# Patient Record
Sex: MALE | NOT HISPANIC OR LATINO
[De-identification: names, ages, dates, MRNs, and addresses within clinical notes are randomized per-mention and may not be internally consistent; named-entity substitution may affect disease eponyms.]

---

## 2017-01-04 ENCOUNTER — PRIOR ORIGINAL RECORDS (OUTPATIENT)
Dept: OTHER | Age: 62
End: 2017-01-04

## 2017-07-05 ENCOUNTER — PRIOR ORIGINAL RECORDS (OUTPATIENT)
Dept: OTHER | Age: 62
End: 2017-07-05

## 2017-07-24 PROBLEM — M71.22 BAKER'S CYST, LEFT: Status: ACTIVE | Noted: 2017-07-24

## 2017-09-11 PROBLEM — S83.242D OTHER TEAR OF MEDIAL MENISCUS OF LEFT KNEE AS CURRENT INJURY, SUBSEQUENT ENCOUNTER: Status: ACTIVE | Noted: 2017-09-11

## 2018-01-03 ENCOUNTER — PRIOR ORIGINAL RECORDS (OUTPATIENT)
Dept: OTHER | Age: 63
End: 2018-01-03

## 2018-07-05 ENCOUNTER — ANESTHESIA EVENT (OUTPATIENT)
Dept: SURGERY | Facility: HOSPITAL | Age: 63
End: 2018-07-05
Payer: COMMERCIAL

## 2018-07-06 ENCOUNTER — HOSPITAL ENCOUNTER (OUTPATIENT)
Facility: HOSPITAL | Age: 63
Setting detail: HOSPITAL OUTPATIENT SURGERY
Discharge: HOME | End: 2018-07-06
Attending: ORTHOPAEDIC SURGERY | Admitting: ORTHOPAEDIC SURGERY
Payer: COMMERCIAL

## 2018-07-06 ENCOUNTER — ANESTHESIA (OUTPATIENT)
Dept: SURGERY | Facility: HOSPITAL | Age: 63
End: 2018-07-06
Payer: COMMERCIAL

## 2018-07-06 ENCOUNTER — APPOINTMENT (OUTPATIENT)
Dept: GENERAL RADIOLOGY | Facility: HOSPITAL | Age: 63
End: 2018-07-06
Attending: ORTHOPAEDIC SURGERY
Payer: COMMERCIAL

## 2018-07-06 ENCOUNTER — SURGERY (OUTPATIENT)
Age: 63
End: 2018-07-06

## 2018-07-06 VITALS
TEMPERATURE: 97 F | SYSTOLIC BLOOD PRESSURE: 135 MMHG | HEIGHT: 66 IN | BODY MASS INDEX: 28.31 KG/M2 | WEIGHT: 176.13 LBS | DIASTOLIC BLOOD PRESSURE: 93 MMHG | HEART RATE: 70 BPM | RESPIRATION RATE: 16 BRPM | OXYGEN SATURATION: 96 %

## 2018-07-06 DIAGNOSIS — S83.282D TEAR OF LATERAL MENISCUS OF LEFT KNEE, UNSPECIFIED TEAR TYPE, UNSPECIFIED WHETHER OLD OR CURRENT TEAR, SUBSEQUENT ENCOUNTER: ICD-10-CM

## 2018-07-06 DIAGNOSIS — M71.22 BAKER'S CYST OF KNEE, LEFT: ICD-10-CM

## 2018-07-06 DIAGNOSIS — S83.242D TEAR OF MEDIAL MENISCUS OF LEFT KNEE, UNSPECIFIED TEAR TYPE, UNSPECIFIED WHETHER OLD OR CURRENT TEAR, SUBSEQUENT ENCOUNTER: ICD-10-CM

## 2018-07-06 PROCEDURE — 0SBD4ZZ EXCISION OF LEFT KNEE JOINT, PERCUTANEOUS ENDOSCOPIC APPROACH: ICD-10-PCS | Performed by: ORTHOPAEDIC SURGERY

## 2018-07-06 RX ORDER — HYDROCODONE BITARTRATE AND ACETAMINOPHEN 10; 325 MG/1; MG/1
1-2 TABLET ORAL EVERY 4 HOURS PRN
Qty: 50 TABLET | Refills: 0 | Status: SHIPPED | OUTPATIENT
Start: 2018-07-06 | End: 2019-06-04

## 2018-07-06 RX ORDER — ACETAMINOPHEN 500 MG
1000 TABLET ORAL EVERY 6 HOURS PRN
COMMUNITY
End: 2019-07-10

## 2018-07-06 RX ORDER — SODIUM CHLORIDE, SODIUM LACTATE, POTASSIUM CHLORIDE, CALCIUM CHLORIDE 600; 310; 30; 20 MG/100ML; MG/100ML; MG/100ML; MG/100ML
INJECTION, SOLUTION INTRAVENOUS CONTINUOUS
Status: DISCONTINUED | OUTPATIENT
Start: 2018-07-06 | End: 2018-07-06

## 2018-07-06 RX ORDER — METOCLOPRAMIDE HYDROCHLORIDE 5 MG/ML
10 INJECTION INTRAMUSCULAR; INTRAVENOUS AS NEEDED
Status: DISCONTINUED | OUTPATIENT
Start: 2018-07-06 | End: 2018-07-06

## 2018-07-06 RX ORDER — LABETALOL HYDROCHLORIDE 5 MG/ML
5 INJECTION, SOLUTION INTRAVENOUS EVERY 5 MIN PRN
Status: DISCONTINUED | OUTPATIENT
Start: 2018-07-06 | End: 2018-07-06

## 2018-07-06 RX ORDER — HYDROCODONE BITARTRATE AND ACETAMINOPHEN 5; 325 MG/1; MG/1
1 TABLET ORAL AS NEEDED
Status: COMPLETED | OUTPATIENT
Start: 2018-07-06 | End: 2018-07-06

## 2018-07-06 RX ORDER — NALOXONE HYDROCHLORIDE 0.4 MG/ML
80 INJECTION, SOLUTION INTRAMUSCULAR; INTRAVENOUS; SUBCUTANEOUS AS NEEDED
Status: DISCONTINUED | OUTPATIENT
Start: 2018-07-06 | End: 2018-07-06

## 2018-07-06 RX ORDER — MIDAZOLAM HYDROCHLORIDE 1 MG/ML
1 INJECTION INTRAMUSCULAR; INTRAVENOUS EVERY 5 MIN PRN
Status: DISCONTINUED | OUTPATIENT
Start: 2018-07-06 | End: 2018-07-06

## 2018-07-06 RX ORDER — MEPERIDINE HYDROCHLORIDE 25 MG/ML
INJECTION INTRAMUSCULAR; INTRAVENOUS; SUBCUTANEOUS
Status: COMPLETED
Start: 2018-07-06 | End: 2018-07-06

## 2018-07-06 RX ORDER — MORPHINE SULFATE 4 MG/ML
2 INJECTION, SOLUTION INTRAMUSCULAR; INTRAVENOUS EVERY 5 MIN PRN
Status: DISCONTINUED | OUTPATIENT
Start: 2018-07-06 | End: 2018-07-06

## 2018-07-06 RX ORDER — HYDROCODONE BITARTRATE AND ACETAMINOPHEN 5; 325 MG/1; MG/1
2 TABLET ORAL AS NEEDED
Status: COMPLETED | OUTPATIENT
Start: 2018-07-06 | End: 2018-07-06

## 2018-07-06 RX ORDER — BUPIVACAINE HYDROCHLORIDE AND EPINEPHRINE 5; 5 MG/ML; UG/ML
INJECTION, SOLUTION EPIDURAL; INTRACAUDAL; PERINEURAL AS NEEDED
Status: DISCONTINUED | OUTPATIENT
Start: 2018-07-06 | End: 2018-07-06

## 2018-07-06 RX ORDER — ACETAMINOPHEN 500 MG
1000 TABLET ORAL ONCE
Status: DISCONTINUED | OUTPATIENT
Start: 2018-07-06 | End: 2018-07-06 | Stop reason: HOSPADM

## 2018-07-06 RX ORDER — ONDANSETRON 2 MG/ML
4 INJECTION INTRAMUSCULAR; INTRAVENOUS AS NEEDED
Status: DISCONTINUED | OUTPATIENT
Start: 2018-07-06 | End: 2018-07-06

## 2018-07-06 RX ORDER — MEPERIDINE HYDROCHLORIDE 25 MG/ML
12.5 INJECTION INTRAMUSCULAR; INTRAVENOUS; SUBCUTANEOUS AS NEEDED
Status: DISCONTINUED | OUTPATIENT
Start: 2018-07-06 | End: 2018-07-06

## 2018-07-06 NOTE — ANESTHESIA PREPROCEDURE EVALUATION
PRE-OP EVALUATION    Patient Name: Marcos Calles    Pre-op Diagnosis: Baker's cyst of knee, left [M71.22]  Tear of medial meniscus of left knee, unspecified tear type, unspecified whether old or current tear, subsequent encounter [H33.232I]  Tear of lat degenerative     Left hip pain     Osteoarthrosis, unspecified whether generalized or localized, shoulder region         GLOBAL EXP 8-25-15 / Left Shoulder / Sully Blankschner / Eleonora Piña     Ibrahim's cyst, left     Other tear of medial meniscus of left knee as current

## 2018-07-06 NOTE — ANESTHESIA POSTPROCEDURE EVALUATION
[facility] Buckley Guarneri Patient Status:  Hospital Outpatient Surgery   Age/Gender 65year old male MRN [id number]   [facility] SURGERY Attending Etienne Stubbs MD   Hosp Day # 0 PCP Tanya Hughes MD       Anesthesia Post-op N

## 2018-07-07 NOTE — OPERATIVE REPORT
[facility]    PATIENT'S NAME: Marian Hylton   ATTENDING PHYSICIAN: Melissa Bianchi M.D. OPERATING PHYSICIAN: Melissa Bianchi M.D.    PATIENT ACCOUNT#:   [de-identified]    LOCATION:  PREOPASCC  PRE ASCC 1 EDWP 10  MEDICAL RECORD #:   [id number]       DA trocar was passed and a cannula for the arthroscope. The suprapatellar pouch was inspected first.  It appeared to be normal.  The articular surface of the patella was inspected. Only trace degenerative change was seen.   Tracking of the patella was normal divided. Hemostasis was obtained. Blunt dissection was performed through the subcutaneous fat. The fascia was opened. My gloved digit was used to explore the popliteal space.   I removed the leg powell and externally rotated the leg in order to gain acc

## 2018-07-08 ENCOUNTER — APPOINTMENT (OUTPATIENT)
Dept: ULTRASOUND IMAGING | Facility: HOSPITAL | Age: 63
End: 2018-07-08
Attending: EMERGENCY MEDICINE
Payer: COMMERCIAL

## 2018-07-08 ENCOUNTER — HOSPITAL ENCOUNTER (EMERGENCY)
Facility: HOSPITAL | Age: 63
Discharge: HOME | End: 2018-07-08
Attending: EMERGENCY MEDICINE
Payer: COMMERCIAL

## 2018-07-08 VITALS
DIASTOLIC BLOOD PRESSURE: 71 MMHG | WEIGHT: 173 LBS | HEIGHT: 66 IN | SYSTOLIC BLOOD PRESSURE: 105 MMHG | BODY MASS INDEX: 27.8 KG/M2 | RESPIRATION RATE: 18 BRPM | HEART RATE: 96 BPM | OXYGEN SATURATION: 99 % | TEMPERATURE: 98 F

## 2018-07-08 DIAGNOSIS — G89.18 POSTOPERATIVE PAIN: Primary | ICD-10-CM

## 2018-07-08 PROCEDURE — 99284 EMERGENCY DEPT VISIT MOD MDM: CPT

## 2018-07-08 PROCEDURE — 99283 EMERGENCY DEPT VISIT LOW MDM: CPT

## 2018-07-08 PROCEDURE — 93971 EXTREMITY STUDY: CPT | Performed by: EMERGENCY MEDICINE

## 2018-07-08 RX ORDER — HYDROCODONE BITARTRATE AND ACETAMINOPHEN 5; 325 MG/1; MG/1
2 TABLET ORAL ONCE
Status: COMPLETED | OUTPATIENT
Start: 2018-07-08 | End: 2018-07-08

## 2018-07-08 NOTE — ED PROVIDER NOTES
Patient Seen in: [facility] Emergency Department    History   Patient presents with:  Postop/Procedure Problem    Stated Complaint: post op pain x2 days knee arthroscopy and bakers cyst removal    HPI    65year-old-year-old gentleman comes in for e incision clean dry intact. Slight ecchymosis noted some slight swelling distally. Homans sign negative. Nursing note and vitals reviewed.            ED Course   Labs Reviewed - No data to display    ED Course as of Jul 08 0537  -------------------------

## 2018-07-08 NOTE — ED NOTES
The wife informs staff that the pt, who was instructed by his surgeon to use a walker, hasn't been. When asked by writer how he's been getting around, the pt replies through a chuckle, \"I'm stubborn. \"

## 2018-07-08 NOTE — ED INITIAL ASSESSMENT (HPI)
Pt had surgery Friday morning for repair of meniscus and bakers cysts. Patient sts having severe pain started around midnight.  Did not take any pain medication prior to arrival.

## 2018-07-09 PROBLEM — Z47.89 ORTHOTIC TRAINING: Status: ACTIVE | Noted: 2018-07-09

## 2018-07-25 ENCOUNTER — PRIOR ORIGINAL RECORDS (OUTPATIENT)
Dept: OTHER | Age: 63
End: 2018-07-25

## 2018-08-23 PROBLEM — M94.262 CHONDROMALACIA OF LEFT KNEE: Status: ACTIVE | Noted: 2018-08-23

## 2018-08-23 PROBLEM — Z98.890 S/P LEFT KNEE ARTHROSCOPY: Status: ACTIVE | Noted: 2018-08-23

## 2019-07-10 ENCOUNTER — APPOINTMENT (OUTPATIENT)
Dept: LAB | Age: 64
End: 2019-07-10
Attending: UROLOGY
Payer: COMMERCIAL

## 2019-07-10 DIAGNOSIS — R97.20 PSA ELEVATION: ICD-10-CM

## 2019-07-10 PROCEDURE — 87086 URINE CULTURE/COLONY COUNT: CPT

## 2019-07-25 PROCEDURE — 88305 TISSUE EXAM BY PATHOLOGIST: CPT | Performed by: UROLOGY

## 2019-07-25 PROCEDURE — 88344 IMHCHEM/IMCYTCHM EA MLT ANTB: CPT | Performed by: UROLOGY

## 2019-12-31 ENCOUNTER — PRIOR ORIGINAL RECORDS (OUTPATIENT)
Dept: OTHER | Age: 64
End: 2019-12-31

## 2020-10-09 LAB
ALBUMIN/GLOB SERPL: 1.4 (CALC) (ref 1–2.5)
ALBUMIN: 4.2 G/DL (ref 3.6–5.1)
ALKALINE PHOSPHATASE: 63 UNIT/L (ref 40–115)
ALT: 18 UNIT/L (ref 9–46)
AST: 19 UNIT/L (ref 10–35)
BASO%: 0.5 %
BASO%: 0.9 %
BASO%: 1 %
BASO%: 1 %
BASO: 0 10^3/UL
BILIRUBIN, TOTAL: 0.5 MG/DL (ref 0.2–1.2)
BUN/CREATININE RATIO: NORMAL (CALC) (ref 6–22)
CALCIUM: 9.4 MG/DL (ref 8.6–10.3)
CARBON DIOXIDE: 26 MMOL/L (ref 20–31)
CHLORIDE: 103 MMOL/L (ref 98–110)
CHOL/HDLC RATIO: 4.4 (CALC)
CHOLESTEROL, TOTAL: 241 MG/DL
CRCL (C&G) (MOSAIQ HL): 110.53 ML/MIN
CREATININE CLEARANCE (MOSAIQ HL): 84.7 ML/MIN
CREATININE: 0.77 MG/DL (ref 0.7–1.25)
EGFR AFRICAN AMERICAN: 114 ML/MIN/1.73M2
EGFR NON-AFR. AMERICAN: 98 ML/MIN/1.73M2
EOS%: 1.2 %
EOS%: 1.4 %
EOS%: 2.3 %
EOS%: 2.4 %
EOS: 0 10^3/UL
EOS: 0.1 10^3/UL
GLOBULIN: 3.1 G/DL (CALC) (ref 1.9–3.7)
GLUCOSE: 94 MG/DL (ref 65–99)
HCT: 42.3 % (ref 38–54)
HCT: 42.4 % (ref 38–54)
HCT: 42.8 % (ref 38–54)
HCT: 43.2 % (ref 38–54)
HDL CHOLESTEROL: 55 MG/DL
HEMOGLOBIN A1C: 5.4 % OF TOTAL HGB
HGB: 14.3 G/DL (ref 12–18)
HGB: 14.4 G/DL (ref 12–18)
HGB: 14.6 G/DL (ref 12–18)
HGB: 14.6 G/DL (ref 12–18)
LDL-CHOLESTEROL: 153 MG/DL (CALC)
LYMPH%: 31.8 % (ref 12–44)
LYMPH%: 35 % (ref 12–44)
LYMPH%: 39.6 % (ref 12–44)
LYMPH%: 43.8 % (ref 12–44)
LYMPH: 1 10^3/UL (ref 0.8–2.8)
LYMPH: 1.4 10^3/UL (ref 0.8–2.8)
LYMPH: 1.5 10^3/UL (ref 0.8–2.8)
LYMPH: 1.8 10^3/UL (ref 0.8–2.8)
MCH: 32.3 PG (ref 26–33)
MCH: 32.4 PG (ref 26–33)
MCH: 32.5 PG (ref 26–33)
MCH: 33.3 PG (ref 26–33)
MCHC: 33.1 G/DL (ref 31–36)
MCHC: 34 G/DL (ref 31–36)
MCHC: 34.1 G/DL (ref 31–36)
MCHC: 34.5 G/DL (ref 31–36)
MCV: 95.1 FML (ref 82–100)
MCV: 95.3 FML (ref 82–100)
MCV: 96.4 FML (ref 82–100)
MCV: 97.7 FML (ref 82–100)
MONO%: 12.5 % (ref 2–12)
MONO%: 14.5 % (ref 2–12)
MONO%: 14.5 % (ref 2–12)
MONO%: 16.3 % (ref 2–12)
MONO: 0.4 10^3/UL (ref 0.2–1)
MONO: 0.5 10^3/UL (ref 0.2–1)
MONO: 0.6 10^3/UL (ref 0.2–1)
MONO: 0.7 10^3/UL (ref 0.2–1)
MPV: 8.3 FML (ref 8.6–11.7)
MPV: 8.4 FML (ref 8.6–11.7)
MPV: 8.9 FML (ref 8.6–11.7)
MPV: 9.1 FML (ref 8.6–11.7)
NEUT%: 36.5 % (ref 47–76)
NEUT%: 43.8 % (ref 47–76)
NEUT%: 48.6 % (ref 47–76)
NEUT%: 52.4 % (ref 47–76)
NEUT: 1.5 10^3/UL (ref 1.5–7.1)
NEUT: 1.5 10^3/UL (ref 1.5–7.1)
NEUT: 1.6 10^3/UL (ref 1.5–7.1)
NEUT: 2.1 10^3/UL (ref 1.5–7.1)
NON-HDL CHOLESTEROL: 186 MG/DL (CALC)
PLT: 169 10^3/UL (ref 150–375)
PLT: 174 10^3/UL (ref 150–375)
PLT: 193 10^3/UL (ref 150–375)
PLT: 236 10^3/UL (ref 150–375)
POTASSIUM: 4.3 MMOL/L (ref 3.5–5.3)
PROTEIN, TOTAL: 7.3 G/DL (ref 6.1–8.1)
PSA, TOTAL: 1.6 NG/ML
RBC: 4.39 10^6/UL (ref 4.2–6.2)
RBC: 4.42 10^6/UL (ref 4.2–6.2)
RBC: 4.46 10^6/UL (ref 4.2–6.2)
RBC: 4.49 10^6/UL (ref 4.2–6.2)
RDW-CV: 13.3 %
RDW-CV: 13.4 %
RDW-CV: 13.5 %
RDW-CV: 13.5 %
RDW-SD: 45.2 FML (ref 36–50)
RDW-SD: 45.9 FML (ref 36–50)
RDW-SD: 46.9 FML (ref 36–50)
RDW-SD: 47.4 FML (ref 36–50)
SODIUM: 139 MMOL/L (ref 135–146)
TRIGLYCERIDES: 189 MG/DL
TSH, 3RD GENERATION: 0.97 MIU/L (ref 0.4–4.5)
UREA NITROGEN (BUN): 12 MG/DL (ref 7–25)
VITAMIN D, 25-OH, TOTAL: 34 NG/ML (ref 30–100)
WBC: 3.1 10^3/UL (ref 4.3–11)
WBC: 3.5 10^3/UL (ref 4.3–11)
WBC: 4.1 10^3/UL (ref 4.3–11)
WBC: 4.3 10^3/UL (ref 4.3–11)

## 2020-10-11 VITALS — SYSTOLIC BLOOD PRESSURE: 124 MMHG | DIASTOLIC BLOOD PRESSURE: 82 MMHG | WEIGHT: 171.01 LBS

## 2020-10-11 VITALS
BODY MASS INDEX: 28.66 KG/M2 | DIASTOLIC BLOOD PRESSURE: 84 MMHG | WEIGHT: 172 LBS | SYSTOLIC BLOOD PRESSURE: 140 MMHG | HEIGHT: 65 IN

## 2020-10-11 VITALS — WEIGHT: 169 LBS | SYSTOLIC BLOOD PRESSURE: 126 MMHG | DIASTOLIC BLOOD PRESSURE: 82 MMHG

## 2020-10-11 VITALS
WEIGHT: 177.01 LBS | BODY MASS INDEX: 29.49 KG/M2 | HEIGHT: 65 IN | SYSTOLIC BLOOD PRESSURE: 130 MMHG | DIASTOLIC BLOOD PRESSURE: 84 MMHG

## 2020-11-04 ENCOUNTER — OFFICE VISIT (OUTPATIENT)
Dept: NEUROLOGY | Facility: CLINIC | Age: 65
End: 2020-11-04
Payer: COMMERCIAL

## 2020-11-04 VITALS
WEIGHT: 164 LBS | DIASTOLIC BLOOD PRESSURE: 78 MMHG | HEART RATE: 78 BPM | BODY MASS INDEX: 26 KG/M2 | SYSTOLIC BLOOD PRESSURE: 132 MMHG | RESPIRATION RATE: 14 BRPM

## 2020-11-04 DIAGNOSIS — G25.0 ESSENTIAL TREMOR: Primary | ICD-10-CM

## 2020-11-04 PROCEDURE — 99204 OFFICE O/P NEW MOD 45 MIN: CPT | Performed by: OTHER

## 2020-11-04 PROCEDURE — 3078F DIAST BP <80 MM HG: CPT | Performed by: OTHER

## 2020-11-04 PROCEDURE — 3075F SYST BP GE 130 - 139MM HG: CPT | Performed by: OTHER

## 2020-11-04 NOTE — H&P
Neurology H&P    Hardy Branch Patient Status:  No patient class for patient encounter    1955 MRN [id number]   Location [facility], [address], [address] [facility] Attending No att. providers found   Hosp Day # 0 PCP Edgar Rouse MD surgery     Hyperlipidemia     Arthritis, degenerative     Left hip pain     Osteoarthrosis, unspecified whether generalized or localized, shoulder region         GLOBAL EXP 8-25-15 / Left Shoulder / Redd Reno / Alessandro Palafox     Baker's cyst, left knee  Global 10/ of knowledge.       CRANIAL NERVES II to XII: PERRLA, no ptosis or diplopia, EOM intact, facial sensation intact, strong eye closure, face is symmetric, no dysarthria, tongue midline,  no tongue fasciculations or atrophy, strong shoulder shrug    MOTOR EXAM

## 2020-11-05 PROBLEM — G25.0 ESSENTIAL TREMOR: Status: ACTIVE | Noted: 2020-11-05

## 2020-12-28 ENCOUNTER — LAB ENCOUNTER (OUTPATIENT)
Dept: LAB | Age: 65
End: 2020-12-28
Attending: INTERNAL MEDICINE
Payer: COMMERCIAL

## 2020-12-28 DIAGNOSIS — Z79.899 NEED FOR PROPHYLACTIC CHEMOTHERAPY: ICD-10-CM

## 2020-12-28 DIAGNOSIS — E55.9 AVITAMINOSIS D: ICD-10-CM

## 2020-12-28 DIAGNOSIS — E78.5 HYPERLIPEMIA: Primary | ICD-10-CM

## 2020-12-28 DIAGNOSIS — I10 ESSENTIAL HYPERTENSION, MALIGNANT: ICD-10-CM

## 2020-12-28 DIAGNOSIS — Z00.00 ROUTINE GENERAL MEDICAL EXAMINATION AT A HEALTH CARE FACILITY: ICD-10-CM

## 2020-12-28 PROCEDURE — 86769 SARS-COV-2 COVID-19 ANTIBODY: CPT

## 2020-12-28 PROCEDURE — 84480 ASSAY TRIIODOTHYRONINE (T3): CPT

## 2020-12-28 PROCEDURE — 83036 HEMOGLOBIN GLYCOSYLATED A1C: CPT

## 2020-12-28 PROCEDURE — 36415 COLL VENOUS BLD VENIPUNCTURE: CPT

## 2020-12-28 PROCEDURE — 80061 LIPID PANEL: CPT

## 2020-12-28 PROCEDURE — 82306 VITAMIN D 25 HYDROXY: CPT

## 2020-12-28 PROCEDURE — 84439 ASSAY OF FREE THYROXINE: CPT

## 2020-12-28 PROCEDURE — 80053 COMPREHEN METABOLIC PANEL: CPT

## 2020-12-28 PROCEDURE — 84403 ASSAY OF TOTAL TESTOSTERONE: CPT

## 2020-12-28 PROCEDURE — 84443 ASSAY THYROID STIM HORMONE: CPT

## 2021-03-22 ENCOUNTER — LAB ENCOUNTER (OUTPATIENT)
Dept: LAB | Age: 66
End: 2021-03-22
Attending: INTERNAL MEDICINE
Payer: COMMERCIAL

## 2021-03-22 DIAGNOSIS — E78.5 HYPERLIPEMIA: Primary | ICD-10-CM

## 2021-03-22 DIAGNOSIS — Z79.899 NEED FOR PROPHYLACTIC CHEMOTHERAPY: ICD-10-CM

## 2021-03-22 DIAGNOSIS — R97.20 ELEVATED PROSTATE SPECIFIC ANTIGEN (PSA): ICD-10-CM

## 2021-03-22 DIAGNOSIS — R73.09 IMPAIRED GLUCOSE TOLERANCE TEST: ICD-10-CM

## 2021-03-22 DIAGNOSIS — I10 ESSENTIAL HYPERTENSION, MALIGNANT: ICD-10-CM

## 2021-03-22 LAB
ALBUMIN SERPL-MCNC: 3.8 G/DL (ref 3.4–5)
ALBUMIN/GLOB SERPL: 1.2 {RATIO} (ref 1–2)
ALP LIVER SERPL-CCNC: 47 U/L
ALT SERPL-CCNC: 38 U/L
ANION GAP SERPL CALC-SCNC: 6 MMOL/L (ref 0–18)
AST SERPL-CCNC: 24 U/L (ref 15–37)
BASOPHILS # BLD AUTO: 0.03 X10(3) UL (ref 0–0.2)
BASOPHILS NFR BLD AUTO: 0.7 %
BILIRUB SERPL-MCNC: 0.6 MG/DL (ref 0.1–2)
BUN BLD-MCNC: 17 MG/DL (ref 7–18)
BUN/CREAT SERPL: 23.3 (ref 10–20)
CALCIUM BLD-MCNC: 8.7 MG/DL (ref 8.5–10.1)
CHLORIDE SERPL-SCNC: 106 MMOL/L (ref 98–112)
CO2 SERPL-SCNC: 27 MMOL/L (ref 21–32)
COMPLEXED PSA SERPL-MCNC: 2.79 NG/ML (ref ?–4)
CREAT BLD-MCNC: 0.73 MG/DL
DEPRECATED RDW RBC AUTO: 50.4 FL (ref 35.1–46.3)
EOSINOPHIL # BLD AUTO: 0.09 X10(3) UL (ref 0–0.7)
EOSINOPHIL NFR BLD AUTO: 2.1 %
ERYTHROCYTE [DISTWIDTH] IN BLOOD BY AUTOMATED COUNT: 13.4 % (ref 11–15)
EST. AVERAGE GLUCOSE BLD GHB EST-MCNC: 120 MG/DL (ref 68–126)
GLOBULIN PLAS-MCNC: 3.3 G/DL (ref 2.8–4.4)
GLUCOSE BLD-MCNC: 85 MG/DL (ref 70–99)
HBA1C MFR BLD HPLC: 5.8 % (ref ?–5.7)
HCT VFR BLD AUTO: 44.8 %
HGB BLD-MCNC: 14.3 G/DL
IMM GRANULOCYTES # BLD AUTO: 0.01 X10(3) UL (ref 0–1)
IMM GRANULOCYTES NFR BLD: 0.2 %
LYMPHOCYTES # BLD AUTO: 1.3 X10(3) UL (ref 1–4)
LYMPHOCYTES NFR BLD AUTO: 30.7 %
M PROTEIN MFR SERPL ELPH: 7.1 G/DL (ref 6.4–8.2)
MCH RBC QN AUTO: 32.5 PG (ref 26–34)
MCHC RBC AUTO-ENTMCNC: 31.9 G/DL (ref 31–37)
MCV RBC AUTO: 101.8 FL
MONOCYTES # BLD AUTO: 0.49 X10(3) UL (ref 0.1–1)
MONOCYTES NFR BLD AUTO: 11.6 %
NEUTROPHILS # BLD AUTO: 2.32 X10 (3) UL (ref 1.5–7.7)
NEUTROPHILS # BLD AUTO: 2.32 X10(3) UL (ref 1.5–7.7)
NEUTROPHILS NFR BLD AUTO: 54.7 %
OSMOLALITY SERPL CALC.SUM OF ELEC: 289 MOSM/KG (ref 275–295)
PATIENT FASTING Y/N/NP: YES
PLATELET # BLD AUTO: 190 10(3)UL (ref 150–450)
POTASSIUM SERPL-SCNC: 3.9 MMOL/L (ref 3.5–5.1)
RBC # BLD AUTO: 4.4 X10(6)UL
SODIUM SERPL-SCNC: 139 MMOL/L (ref 136–145)
TESTOST SERPL-MCNC: 340.02 NG/DL
WBC # BLD AUTO: 4.2 X10(3) UL (ref 4–11)

## 2021-03-22 PROCEDURE — 83036 HEMOGLOBIN GLYCOSYLATED A1C: CPT

## 2021-03-22 PROCEDURE — 36415 COLL VENOUS BLD VENIPUNCTURE: CPT

## 2021-03-22 PROCEDURE — 80053 COMPREHEN METABOLIC PANEL: CPT

## 2021-03-22 PROCEDURE — 84403 ASSAY OF TOTAL TESTOSTERONE: CPT

## 2021-03-22 PROCEDURE — 85025 COMPLETE CBC W/AUTO DIFF WBC: CPT

## 2025-05-12 ENCOUNTER — TELEPHONE (OUTPATIENT)
Facility: CLINIC | Age: 70
End: 2025-05-12

## 2025-05-12 ENCOUNTER — HOSPITAL ENCOUNTER (OUTPATIENT)
Dept: GENERAL RADIOLOGY | Age: 70
Discharge: HOME | End: 2025-05-12
Attending: PHYSICIAN ASSISTANT
Payer: COMMERCIAL

## 2025-05-12 ENCOUNTER — OFFICE VISIT (OUTPATIENT)
Facility: CLINIC | Age: 70
End: 2025-05-12
Payer: COMMERCIAL

## 2025-05-12 VITALS — HEIGHT: 67 IN | BODY MASS INDEX: 25.11 KG/M2 | WEIGHT: 160 LBS

## 2025-05-12 DIAGNOSIS — M25.552 LEFT HIP PAIN: ICD-10-CM

## 2025-05-12 DIAGNOSIS — M17.12 PRIMARY OSTEOARTHRITIS OF LEFT KNEE: Primary | ICD-10-CM

## 2025-05-12 DIAGNOSIS — M25.562 LEFT KNEE PAIN, UNSPECIFIED CHRONICITY: ICD-10-CM

## 2025-05-12 DIAGNOSIS — M25.562 LEFT KNEE PAIN, UNSPECIFIED CHRONICITY: Primary | ICD-10-CM

## 2025-05-12 PROCEDURE — 73564 X-RAY EXAM KNEE 4 OR MORE: CPT | Performed by: PHYSICIAN ASSISTANT

## 2025-05-12 PROCEDURE — 73502 X-RAY EXAM HIP UNI 2-3 VIEWS: CPT | Performed by: PHYSICIAN ASSISTANT

## 2025-05-12 RX ORDER — TRIAMCINOLONE ACETONIDE 40 MG/ML
40 INJECTION, SUSPENSION INTRA-ARTICULAR; INTRAMUSCULAR ONCE
Status: COMPLETED | OUTPATIENT
Start: 2025-05-12 | End: 2025-05-12

## 2025-05-12 RX ADMIN — TRIAMCINOLONE ACETONIDE 40 MG: 40 INJECTION, SUSPENSION INTRA-ARTICULAR; INTRAMUSCULAR at 14:58:00

## 2025-05-12 NOTE — TELEPHONE ENCOUNTER
XR ordered and scheduled per Ortho protocol.   S/W to inform them and ask them to arrive 15-20 minutes prior to appointment with Beto.

## 2025-05-12 NOTE — PROCEDURES
Risks and benefits of knee injection discussed with the patient, with risks including but not limited to pain and swelling at the injection site and/or within the knee joint, infection, elevation in blood pressure and/or glucose levels, facial flushing. After informed consent, the patient's left knee was marked, locally anesthetized with skin refrigerant, prepped with topical antiseptic, and injected with a mixture of 1mL 40mg/mL Kenalog, 2mL 1% lidocaine and 2mL 0.5% marcaine through the inferolateral portal.  A band-aid was applied.  The patient tolerated the procedure well.    Beto Huitron PA-C  Located within [facility] Orthopedic Surgery

## 2025-05-12 NOTE — H&P
The following individual(s) verbally consented to be recorded using ambient AI listening technology and understand that they can each withdraw their consent to this listening technology at any point by asking the clinician to turn off or pause the recording:    Patient name: Derik Tyler  Additional names:  Sandra Tyler    History of Present Illness  Derik Tyler is a 69 year old male who presents with knee and hip pain. He was referred by Dr. Camacho Wagner for orthopedic evaluation and consideration of physical therapy.    He experiences left knee pain primarily located in the back of the knee, which worsened last week. There was no specific injury that initiated the pain. He experienced swelling in the back of the knee but no tenderness upon palpation. Occasionally, he feels pain in the front of the knee, but most of the pain is in the back. The pain is described as 'on and off' and he has not received any injections or surgeries for the knee previously.    He also experiences left hip pain, primarily felt deep inside the joint, with difficulty squatting and rising. There is muscle pain in the front of both thighs. No significant pain with movement or palpation of the hip, but he notes occasional clicking or popping within the left hip, particularly when bringing the left leg into a LUISA position. No pain when lying on his left side at night.    He has a history of being active with daily exercise, including squats, but became sedentary [place].     Past Medical History[1]  Past Surgical History[2]  Current Medications[3]  Allergies[4]  Family History[5]  Social History     Occupational History    Not on file   Tobacco Use    Smoking status: Former     Current packs/day: 0.00     Types: Cigarettes     Start date: 1972     Quit date: 1976     Years since quittin.1    Smokeless tobacco: Never   Substance and Sexual Activity    Alcohol use:  Yes     Comment: socially    Drug use: No    Sexual activity: Not on file        ROS:  Comprehensive system review obtained and negative except as mentioned above    Physical Exam  Ht 5' 7\" (1.702 m)   Wt 160 lb (72.6 kg)   BMI 25.06 kg/m²   Constitutional: Awake, alert, no distress. Present with spouse  Psychological: Appropriate affect.  Respiratory: Unlabored breathing.  Left lower extremity:  Inspection: skin is intact without any redness, deformity, or effusion.   Palpation: Tender to palpation along the popliteal fossa. No significant tenderness around the hip.  Range of motion: Internal rotation of hip to approximately 30 degrees. External rotation of hip to approximately 45 degrees. No significant hip pain with rotation.  Left knee flexion to 130 degrees extension is full  Stinchfield test negative.   Neuromuscular: Strength is normal and sensation is intact.  Vascular: Extremities are warm and well-perfused.  Lymph: Unremarkable.    Results  RADIOLOGY  Left knee X-ray: Mild medial and lateral joint space narrowing, early osteoarthritis, incipient osteophyte formation, accessory ossicle in the posterior knee, no severe osteoarthritis, patellofemoral joint appears normal, mild femorotibial joint space narrowing (05/12/2025).  Left hip X-ray: No significant osteoarthritis, excellent joint space between femoral head and acetabulum, symmetric, minimal osteophyte formation at the acetabular rim, good range of motion (05/12/2025).    Assessment & Plan  Knee osteoarthritis  Early osteoarthritis in the left knee with mild joint space narrowing and bone spur formation. Pain associated with a Baker's cyst. Cortisone injection considered to reduce inflammation and prevent cyst enlargement.  - Administer cortisone injection into the left knee.  - Refer to physical therapy for knee and hip strengthening.  - Consider aspiration of the Baker's cyst if symptoms persist after cortisone injection.    Baker's cyst  Baker's  cyst secondary to knee osteoarthritis. Aspiration considered if symptoms persist after addressing knee inflammation.  - Administer cortisone injection into the left knee to reduce inflammation.  - Consider aspiration of the Baker's cyst if symptoms persist after cortisone injection.    Hip pain  Hip pain with no significant arthritis. Likely related to muscle tightness or knee issues. Hip joint shows excellent spacing with minimal bone spurring.  - Refer to physical therapy for hip and knee strengthening.    Beto Huitron PA-C  [facility] Orthopedic Surgery       [1]   Past Medical History:   Essential hypertension    High cholesterol    HYPERLIPIDEMIA    Other and unspecified hyperlipidemia    Visual impairment    glasses and contact lenses   [2]   Past Surgical History:  Procedure Laterality Date    Hernia surgery      Hernia surgery  06/27/2019    Repair umbilical hernia with mesh    Other surgical history      left shoudler arthroscopy    Repair ing hernia,5+y/o,reducibl      Repair rotator cuff,chronic     [3]   Current Outpatient Medications   Medication Sig Dispense Refill    celecoxib (CELEBREX) 200 MG Oral Cap Take 1 capsule (200 mg total) by mouth daily. Prn pain 30 capsule 0    lisinopril 5 MG Oral Tab       OMEGA-3 KRILL OIL OR Take by mouth. 350 mg once a day      NON FORMULARY Nature's Bounty vitamin with Argan oil for hair, skin and nails one gel daily      atorvastatin 20 MG Oral Tab Take 20 mg by mouth nightly.        Multiple Vitamins-Minerals (MENS 50+ MULTI VITAMIN/MIN) Oral Tab Take by mouth daily.        HYDROcodone-acetaminophen (NORCO)  MG Oral Tab Take 1 tablet by mouth every 6 (six) hours as needed for Pain. (Patient not taking: Reported on 5/12/2025) 24 tablet 0    Sulfamethoxazole-TMP DS (BACTRIM DS) 800-160 MG Oral Tab per tablet Take 1 tablet by mouth 2 (two) times daily. Start the morning of the day before prostate biopsy procedure (Patient not taking: Reported on  5/12/2025) 6 tablet 0   [4] No Known Allergies  [5]   Family History  Problem Relation Age of Onset    Diabetes Mother     Cancer Sister     Heart Disorder Brother

## 2025-05-20 ENCOUNTER — TELEPHONE (OUTPATIENT)
Dept: ORTHOPEDICS CLINIC | Facility: CLINIC | Age: 70
End: 2025-05-20

## 2025-05-20 NOTE — TELEPHONE ENCOUNTER
Patient's spouse reached out and stated that patient would like to move forward with the ultrasound guided aspiration of the bakers cyst.    Patient is also requesting if this can possibly be done on 6/17/2025 as this is an off day for patient's spouse who would be bringing him.    Please advise if this would be done by Beto or Dr. Britt.

## 2025-05-20 NOTE — TELEPHONE ENCOUNTER
Please schedule patient with Dr Britt for the US guided aspiration of left knee baker's cyst.  Thank you!        Patient's spouse reached out and stated that patient would like to move forward with the ultrasound guided aspiration of the bakers cyst.    Patient is also requesting if this can possibly be done on 6/17/2025 as this is an off day for patient's spouse who would be bringing him.    Please advise if this would be done by Beto or Dr. Britt.

## 2025-06-09 ENCOUNTER — TELEPHONE (OUTPATIENT)
Facility: CLINIC | Age: 70
End: 2025-06-09

## 2025-06-09 NOTE — TELEPHONE ENCOUNTER
Patient spouse called her  Physical Therapist is recommending him to see if he can get an MRI order.       Please advise.

## 2025-06-09 NOTE — TELEPHONE ENCOUNTER
MRI cannot be done until patient completes therapy, as insurance won't pay for MRI until then.   Advised patient and wife via phone call.  They verbalized understanding.

## 2025-06-13 ENCOUNTER — OFFICE VISIT (OUTPATIENT)
Facility: CLINIC | Age: 70
End: 2025-06-13
Payer: COMMERCIAL

## 2025-06-13 VITALS — BODY MASS INDEX: 25.11 KG/M2 | HEIGHT: 67 IN | WEIGHT: 160 LBS

## 2025-06-13 DIAGNOSIS — M71.22 POPLITEAL CYST, LEFT: ICD-10-CM

## 2025-06-13 DIAGNOSIS — M17.12 PRIMARY OSTEOARTHRITIS OF LEFT KNEE: Primary | ICD-10-CM

## 2025-06-13 RX ORDER — TRIAMCINOLONE ACETONIDE 40 MG/ML
40 INJECTION, SUSPENSION INTRA-ARTICULAR; INTRAMUSCULAR ONCE
Status: COMPLETED | OUTPATIENT
Start: 2025-06-13 | End: 2025-06-13

## 2025-06-13 RX ADMIN — TRIAMCINOLONE ACETONIDE 40 MG: 40 INJECTION, SUSPENSION INTRA-ARTICULAR; INTRAMUSCULAR at 08:28:00

## 2025-06-13 NOTE — H&P
Sports Medicine Clinic Note    Subjective:    Chief Complaint: Left knee pain with posterior swelling    History: 69-year-old male presents with persistent pain and swelling in the left knee, predominantly in the posterior region. Symptoms have continued despite a prior corticosteroid injection earlier this month. He reports ongoing limping and diffuse knee pain that interferes with walking. A prior clinician suspected a Baker’s cyst. The patient has a history of bilateral knee replacements and is uncertain of the severity of any underlying arthritis. He denies any recent trauma or injury to the area.    Objective:    Left Knee Examination:    Inspection: No redness or gross deformity. No significant visible swelling or erythema.  Palpation: Tenderness in the popliteal fossa; no fluctuance appreciated. No significant tenderness around the hip.  Range of Motion: Flexion to 130 degrees; full extension. No significant pain with motion.  Neurovascular: Strength preserved; sensation intact. Extremities warm and well-perfused.  Special Tests: Stinchfield test negative. No joint line tenderness, instability, or mechanical symptoms noted.    Diagnostic Tests:    Radiographs of the left knee demonstrated mild medial and patellofemoral compartment joint space loss, small marginal osteophytes, and trace suprapatellar effusion without fracture or malalignment.    Previous radiographs of the left hip demonstrated mild osteoarthritis with minimal osteophyte formation and preserved joint space.    Assessment:    Left knee pain secondary to mild osteoarthritis  Suspected Baker's cyst in the left popliteal region, likely reactive to underlying knee pathology    Plan:    Procedures: Ultrasound-guided corticosteroid injection into the left popliteal space performed using 1 mL triamcinolone (40 mg/mL) and 2 mL of 1% lidocaine without epinephrine. There was minimal fluid collection identified unable to aspirate. Procedure was well  tolerated without complications.  Additional Workup: Consider lumbar workup for referred pain generator vs advanced knee imaging.  Medications: Continue Celecoxib as prescribed; acetaminophen as needed for post-injection pain.  Activity Recommendations: Avoid strenuous activity for 48 hours. Ice and rest the area for 2-3 days before gradual resumption of regular activity.    Follow-Up: Tentatively scheduled in 2 to 4 weeks to monitor symptom response to injection and reassess further treatment needs.    - - -    Ultrasound Guided Procedure Note:    Confirmed that the patient does not have history of prior adverse reactions, active infections, or relevant allergies. There was no erythema or warmth, and the skin was clear. After discussion of the risks and benefits, the patient elected to proceed with an ultrasound guided injection into the popliteal space, left side:    The skin was sterilized with ChloraPrep. A 22 gauge needle was inserted via inferolateral approach utilizing US for needle guidance and placement. The site was injected with a mixture of 1 mL of triamcinolone 40 mg/mL and 2 mL of 1% Lidocaine without Epinephrine. The injection was completed without complication, and a bandage was applied. The patient tolerated the procedure well and was instructed to avoid strenuous activity for the next 24-48 hours and to use ice or Tylenol for pain as needed. The patient will call immediately with any signs of infection or allergic reaction.    Post-Injection Care: The patient tolerated the procedure well. An occlusive bandage was placed over the injection site. Post-injection care instructions provided to the patient. The patient was asked to avoid strenuous activity and continue to rest the area for 2-3 days before resuming regular activities. Patient advised that the area may be more painful for the first 1-2 days. They can use ice or Tylenol for pain as needed.  Patient was instructed to watch for fever,  increased swelling, or persistent pain. The patient will call immediately with any signs of infection or allergic reaction.      Pepe Britt DO, CAQSM   Primary Care Sports Medicine

## 2025-06-28 ENCOUNTER — HOSPITAL ENCOUNTER (EMERGENCY)
Age: 70
Discharge: HOME | End: 2025-06-28
Attending: EMERGENCY MEDICINE
Payer: COMMERCIAL

## 2025-06-28 VITALS
HEART RATE: 100 BPM | RESPIRATION RATE: 18 BRPM | SYSTOLIC BLOOD PRESSURE: 163 MMHG | OXYGEN SATURATION: 97 % | TEMPERATURE: 98 F | BODY MASS INDEX: 26 KG/M2 | WEIGHT: 165 LBS | DIASTOLIC BLOOD PRESSURE: 100 MMHG

## 2025-06-28 DIAGNOSIS — K04.7 DENTAL INFECTION: Primary | ICD-10-CM

## 2025-06-28 PROCEDURE — 99284 EMERGENCY DEPT VISIT MOD MDM: CPT

## 2025-06-28 PROCEDURE — 99283 EMERGENCY DEPT VISIT LOW MDM: CPT

## 2025-06-28 RX ORDER — AMOXICILLIN 875 MG/1
875 TABLET, COATED ORAL ONCE
Status: COMPLETED | OUTPATIENT
Start: 2025-06-28 | End: 2025-06-28

## 2025-06-28 RX ORDER — AMOXICILLIN 875 MG/1
875 TABLET, COATED ORAL 2 TIMES DAILY
Qty: 20 TABLET | Refills: 0 | Status: SHIPPED | OUTPATIENT
Start: 2025-06-28 | End: 2025-07-08

## 2025-06-28 NOTE — ED PROVIDER NOTES
Patient Seen in: [facility] In [place]        History  Chief Complaint   Patient presents with    Swelling Edema     Stated Complaint: right facial swelling    Subjective:   HPI            69-year-old male with a history of hypertension presents with right-sided facial swelling.  Patient noted yesterday.  He is having some dental pain at the area of his implant.  Noted some swelling to the right cheek.  But no trouble swallowing or opening mouth fever or chills.  No other symptoms.  No other complaints.      Objective:     Past Medical History:    Essential hypertension    High cholesterol    HYPERLIPIDEMIA    Other and unspecified hyperlipidemia    Visual impairment    glasses and contact lenses              Past Surgical History:   Procedure Laterality Date    Hernia surgery      Hernia surgery  2019    Repair umbilical hernia with mesh    Other surgical history      left shoudler arthroscopy    Repair ing hernia,5+y/o,reducibl      Repair rotator cuff,chronic                  Social History     Socioeconomic History    Marital status:    Tobacco Use    Smoking status: Former     Current packs/day: 0.00     Types: Cigarettes     Start date: 1972     Quit date: 1976     Years since quittin.2    Smokeless tobacco: Never   Substance and Sexual Activity    Alcohol use: Yes     Comment: socially    Drug use: No   Other Topics Concern    Caffeine Concern Yes    Exercise Yes                                Physical Exam    ED Triage Vitals   BP 25 1438 (!) 163/87   Pulse 25 1437 109   Resp 25 1437 20   Temp 25 1438 99.1 °F (37.3 °C)   Temp src 25 1438 Temporal   SpO2 25 1438 97 %   O2 Device 25 1438 None (Room air)       Current Vitals:   Vital Signs  BP: (!) 163/100  Pulse: 100  Resp: 18  Temp: 98 °F (36.7 °C)  Temp src: Oral    Oxygen Therapy  SpO2: 97 %  O2 Device: None (Room air)            Physical Exam  Vitals and nursing  note reviewed.   Constitutional:       General: He is not in acute distress.     Appearance: He is well-developed. He is not toxic-appearing.   HENT:      Head: Normocephalic and atraumatic.      Mouth/Throat:      Mouth: Mucous membranes are moist.      Dentition: Abnormal dentition. Dental tenderness and dental caries present.      Pharynx: Oropharynx is clear. No oropharyngeal exudate or posterior oropharyngeal erythema.        Comments: General inflammation and gum recession in the area diagrammed with slight tenderness  Eyes:      General: No scleral icterus.     Conjunctiva/sclera: Conjunctivae normal.   Cardiovascular:      Rate and Rhythm: Normal rate.   Pulmonary:      Effort: Pulmonary effort is normal. No respiratory distress.   Abdominal:      General: There is no distension.   Musculoskeletal:         General: No tenderness. Normal range of motion.      Cervical back: Normal range of motion and neck supple.   Skin:     General: Skin is warm and dry.      Findings: No rash.   Neurological:      Mental Status: He is alert and oriented to person, place, and time.      Motor: No abnormal muscle tone.      Coordination: Coordination normal.   Psychiatric:         Behavior: Behavior normal.                ED Course  Labs Reviewed - No data to display                           MDM           -Comorbidities did add complexity to the management are mentioned in the HPI above        -I personally reviewed the prior external notes and the medical record to obtain additional history reviewed orthopedics note he has history of osteoarthritis of the left hip and knee June 17, 2020        -DDX: Includes but not limited to gingivitis, dental infection, dental abscess, Sixto angina which is a medical condition that can pose a threat to life/function      Swelling likely related to underlying dental infection.  No obvious abscess visible for me to drain.  He does not have any trismus.  Will follow-up with dentistry on  Monday he was discharged home stable condition.    Medical Decision Making      Disposition and Plan     Clinical Impression:  1. Dental infection         Disposition:  Discharge  6/28/2025  4:13 pm    Follow-up:  Billie Rivero, DMD  [address]   [address] [address] [phone]    Schedule an appointment as soon as possible for a visit            Medications Prescribed:  Current Discharge Medication List                Supplementary Documentation:

## (undated) DEVICE — KENDALL SCD EXPRESS SLEEVES, KNEE LENGTH, MEDIUM: Brand: KENDALL SCD

## (undated) DEVICE — PADDING CAST COTTON  4

## (undated) DEVICE — [AGGRESSIVE PLUS CUTTER, ARTHROSCOPIC SHAVER BLADE,  DO NOT RESTERILIZE,  DO NOT USE IF PACKAGE IS DAMAGED,  KEEP DRY,  KEEP AWAY FROM SUNLIGHT]: Brand: FORMULA

## (undated) DEVICE — SOL  .9 3000ML

## (undated) DEVICE — NON-ADHERENT STRIPS,OIL EMULSION: Brand: CURITY

## (undated) DEVICE — GLOVE SURG SENSICARE SZ 8-1/2

## (undated) DEVICE — STERILE POLYISOPRENE POWDER-FREE SURGICAL GLOVES: Brand: PROTEXIS

## (undated) DEVICE — KNEE ARTHROSCOPY CDS: Brand: MEDLINE INDUSTRIES, INC.

## (undated) DEVICE — SUTURE ETHILON 5-0 PS-3

## (undated) DEVICE — ZIMMER® STERILE DISPOSABLE TOURNIQUET CUFF WITH PLC, DUAL PORT, SINGLE BLADDER, 34 IN. (86 CM)

## (undated) DEVICE — 10K/24K ARTHROSCOPY INFLOW TUBE SET: Brand: 10K/24K

## (undated) NOTE — ED AVS SNAPSHOT
Ranjit Mix   MRN: [id number]    Department:  [facility] Emergency Department   Date of Visit:  7/8/2018           Disclosure     Insurance plans vary and the physician(s) referred by the ER may not be covered by your plan.  Please contact you tell this physician (or your personal doctor if your instructions are to return to your personal doctor) about any new or lasting problems. The primary care or specialist physician will see patients referred from the [facility] Emergency Department.  Darlyn Willis